# Patient Record
Sex: FEMALE | Race: BLACK OR AFRICAN AMERICAN | NOT HISPANIC OR LATINO | Employment: FULL TIME | URBAN - NONMETROPOLITAN AREA
[De-identification: names, ages, dates, MRNs, and addresses within clinical notes are randomized per-mention and may not be internally consistent; named-entity substitution may affect disease eponyms.]

---

## 2023-01-20 ENCOUNTER — OFFICE VISIT (OUTPATIENT)
Dept: FAMILY MEDICINE | Facility: CLINIC | Age: 48
End: 2023-01-20
Payer: COMMERCIAL

## 2023-01-20 VITALS
BODY MASS INDEX: 48.82 KG/M2 | HEIGHT: 65 IN | WEIGHT: 293 LBS | TEMPERATURE: 98 F | OXYGEN SATURATION: 99 % | SYSTOLIC BLOOD PRESSURE: 127 MMHG | DIASTOLIC BLOOD PRESSURE: 78 MMHG | RESPIRATION RATE: 18 BRPM | HEART RATE: 90 BPM

## 2023-01-20 DIAGNOSIS — Z11.3 SCREEN FOR STD (SEXUALLY TRANSMITTED DISEASE): Primary | ICD-10-CM

## 2023-01-20 LAB
HIV 1+O+2 AB SERPL QL: NORMAL
SYPHILIS AB INTERPRETATION: NORMAL

## 2023-01-20 PROCEDURE — 1159F PR MEDICATION LIST DOCUMENTED IN MEDICAL RECORD: ICD-10-PCS | Mod: ,,, | Performed by: NURSE PRACTITIONER

## 2023-01-20 PROCEDURE — 3074F SYST BP LT 130 MM HG: CPT | Mod: ,,, | Performed by: NURSE PRACTITIONER

## 2023-01-20 PROCEDURE — 87491 CHLAMYDIA/GONORRHOEAE(GC), PCR: ICD-10-PCS | Mod: ,,, | Performed by: CLINICAL MEDICAL LABORATORY

## 2023-01-20 PROCEDURE — 87661 TRICHOMONAS VAGINALIS AMPLIF: CPT | Mod: ,,, | Performed by: CLINICAL MEDICAL LABORATORY

## 2023-01-20 PROCEDURE — 87389 HIV 1 / 2 ANTIBODY: ICD-10-PCS | Mod: ,,, | Performed by: CLINICAL MEDICAL LABORATORY

## 2023-01-20 PROCEDURE — 3008F PR BODY MASS INDEX (BMI) DOCUMENTED: ICD-10-PCS | Mod: ,,, | Performed by: NURSE PRACTITIONER

## 2023-01-20 PROCEDURE — 99203 PR OFFICE/OUTPT VISIT, NEW, LEVL III, 30-44 MIN: ICD-10-PCS | Mod: ,,, | Performed by: NURSE PRACTITIONER

## 2023-01-20 PROCEDURE — 87389 HIV-1 AG W/HIV-1&-2 AB AG IA: CPT | Mod: ,,, | Performed by: CLINICAL MEDICAL LABORATORY

## 2023-01-20 PROCEDURE — 87491 CHLMYD TRACH DNA AMP PROBE: CPT | Mod: ,,, | Performed by: CLINICAL MEDICAL LABORATORY

## 2023-01-20 PROCEDURE — 3078F PR MOST RECENT DIASTOLIC BLOOD PRESSURE < 80 MM HG: ICD-10-PCS | Mod: ,,, | Performed by: NURSE PRACTITIONER

## 2023-01-20 PROCEDURE — 1159F MED LIST DOCD IN RCRD: CPT | Mod: ,,, | Performed by: NURSE PRACTITIONER

## 2023-01-20 PROCEDURE — 87591 CHLAMYDIA/GONORRHOEAE(GC), PCR: ICD-10-PCS | Mod: ,,, | Performed by: CLINICAL MEDICAL LABORATORY

## 2023-01-20 PROCEDURE — 3074F PR MOST RECENT SYSTOLIC BLOOD PRESSURE < 130 MM HG: ICD-10-PCS | Mod: ,,, | Performed by: NURSE PRACTITIONER

## 2023-01-20 PROCEDURE — 86780 TREPONEMA PALLIDUM: CPT | Mod: ,,, | Performed by: CLINICAL MEDICAL LABORATORY

## 2023-01-20 PROCEDURE — 87661 TRICHOMONAS VAGINALIS BY PCR: ICD-10-PCS | Mod: ,,, | Performed by: CLINICAL MEDICAL LABORATORY

## 2023-01-20 PROCEDURE — 99203 OFFICE O/P NEW LOW 30 MIN: CPT | Mod: ,,, | Performed by: NURSE PRACTITIONER

## 2023-01-20 PROCEDURE — 87591 N.GONORRHOEAE DNA AMP PROB: CPT | Mod: ,,, | Performed by: CLINICAL MEDICAL LABORATORY

## 2023-01-20 PROCEDURE — 3078F DIAST BP <80 MM HG: CPT | Mod: ,,, | Performed by: NURSE PRACTITIONER

## 2023-01-20 PROCEDURE — 86780 TREPONEMA PALLIDUM (SYPHILIS) ANTIBODY: ICD-10-PCS | Mod: ,,, | Performed by: CLINICAL MEDICAL LABORATORY

## 2023-01-20 PROCEDURE — 3008F BODY MASS INDEX DOCD: CPT | Mod: ,,, | Performed by: NURSE PRACTITIONER

## 2023-01-20 RX ORDER — HYDROCHLOROTHIAZIDE 12.5 MG/1
CAPSULE ORAL
COMMUNITY

## 2023-01-20 RX ORDER — AMLODIPINE BESYLATE 10 MG/1
TABLET ORAL
COMMUNITY

## 2023-01-20 RX ORDER — LOSARTAN POTASSIUM 100 MG/1
TABLET ORAL
COMMUNITY

## 2023-01-20 NOTE — PROGRESS NOTES
Holly Smith NP   [facility]  [id number] [place] [address] [place] [address]  [phone]      PATIENT NAME: Kristan Mei  : 1975  DATE: 23  MRN: [id number]      Billing Provider: Holly Smith NP  Level of Service: [facility] OFFICE/OUTPT VISIT, NEW, LEVL III, 30-44 MIN  Patient PCP Information       Provider PCP Type    Holly Smith NP General            Reason for Visit / Chief Complaint: Exposure to STD (States her sex partner has been having some urinary issues./States he is gone from home a lot for his work./She has concerns and would like to be checked for std's./Denies any personal symptoms.)       Update PCP  Update Chief Complaint         History of Present Illness / Problem Focused Workflow     Kristan Mei presents to the clinic with Exposure to STD (States her sex partner has been having some urinary issues./States he is gone from home a lot for his work./She has concerns and would like to be checked for std's./Denies any personal symptoms.)     47 year old female presents to clinic with complaints of possible exposure to STDs. She states her partner has been having some burning with urination, that he is out of town a lot for work. She does not know if he has an STD but she just wants to be checked. She denies any discharge, odor, pain, or lesions.        Review of Systems     Review of Systems   Constitutional:  Negative for appetite change, fatigue and fever.   HENT:  Negative for congestion, postnasal drip and sinus pressure.    Eyes:  Negative for visual disturbance.   Respiratory:  Negative for cough and shortness of breath.    Cardiovascular:  Negative for palpitations and leg swelling.   Gastrointestinal:  Negative for abdominal distention and abdominal pain.   Endocrine: Negative for cold intolerance and heat intolerance.   Genitourinary:  Negative for difficulty urinating, dysuria and flank pain.   Musculoskeletal:   Negative for arthralgias and myalgias.   Skin:  Negative for rash and wound.   Neurological:  Negative for dizziness, light-headedness and headaches.   Psychiatric/Behavioral:  Negative for sleep disturbance.      Medical / Social / Family History     Past Medical History:   Diagnosis Date    Hypertension        History reviewed. No pertinent surgical history.    Social History  Ms. Mei  reports that she has never smoked. She has never used smokeless tobacco. She reports that she does not drink alcohol and does not use drugs.    Family History  Ms.'s Mei family history includes Heart disease in her mother.    Medications and Allergies     Medications  Outpatient Medications Marked as Taking for the 1/20/23 encounter (Office Visit) with Holly Smith NP   Medication Sig Dispense Refill    amLODIPine (NORVASC) 10 MG tablet 1 tablet Orally Once a day for 30 day(s)      hydroCHLOROthiazide (MICROZIDE) 12.5 mg capsule 1 capsule in the morning Orally Once a day for 30 day(s)      losartan (COZAAR) 100 MG tablet 1 tablet Orally Once a day for 90 days         Allergies  Review of patient's allergies indicates:  No Known Allergies    Physical Examination     Vitals:    01/20/23 1328   BP: 127/78   Pulse: 90   Resp: 18   Temp: 98.4 °F (36.9 °C)     Physical Exam  Vitals and nursing note reviewed.   Constitutional:       Appearance: Normal appearance.   HENT:      Head: Normocephalic.      Right Ear: Tympanic membrane normal.      Left Ear: Tympanic membrane normal.      Nose: Nose normal.      Mouth/Throat:      Mouth: Mucous membranes are moist.   Eyes:      Conjunctiva/sclera: Conjunctivae normal.   Cardiovascular:      Rate and Rhythm: Normal rate and regular rhythm.      Pulses: Normal pulses.      Heart sounds: Normal heart sounds.   Pulmonary:      Effort: Pulmonary effort is normal.      Breath sounds: Normal breath sounds.   Abdominal:      General: Bowel sounds are normal.      Palpations: Abdomen is  soft.   Genitourinary:     Comments: She declines  exam but denies any discharge, odor, pain, or lesions.    Musculoskeletal:         General: Normal range of motion.      Cervical back: Normal range of motion.   Skin:     General: Skin is warm and dry.      Capillary Refill: Capillary refill takes less than 2 seconds.   Neurological:      Mental Status: She is alert and oriented to person, place, and time.   Psychiatric:         Mood and Affect: Mood normal.       Assessment and Plan (including Health Maintenance)      Problem List  Smart Sets  Document Outside HM   :    Plan:         Health Maintenance Due   Topic Date Due    Hepatitis C Screening  Never done    Cervical Cancer Screening  Never done    Lipid Panel  Never done    COVID-19 Vaccine (1) Never done    HIV Screening  Never done    TETANUS VACCINE  Never done    Mammogram  Never done    Hemoglobin A1c (Diabetic Prevention Screening)  Never done    Influenza Vaccine (1) Never done    Colorectal Cancer Screening  Never done       Problem List Items Addressed This Visit          ID    Screen for STD (sexually transmitted disease) - Primary    Current Assessment & Plan     Testing for HIV, Syphillis, GC, Chlamydia, Trichomonas performed. Will follow up with results.   Safer sex practices encouraged using a barrier method. Avoid sexual activities until symptoms subside and test results are received. Instructed to follow-up if symptoms persist past treatment plan or worsen to return to clinic for further evaluation and treatment.'           Relevant Orders    Chlamydia/GC, PCR    HIV 1/2 Ag/Ab (4th Gen)    Trichomonas vaginalis by PCR    Syphilis Antibody with reflex to RPR       The patient has no Health Maintenance topics of status Not Due    No future appointments.         Signature:  Holly Smith NP  [facility] [address] [place] [address]  [phone]    Date of encounter: 1/20/23

## 2023-01-20 NOTE — ASSESSMENT & PLAN NOTE
Testing for HIV, Syphillis, GC, Chlamydia, Trichomonas performed. Will follow up with results.   Safer sex practices encouraged using a barrier method. Avoid sexual activities until symptoms subside and test results are received. Instructed to follow-up if symptoms persist past treatment plan or worsen to return to clinic for further evaluation and treatment.'

## 2023-01-21 LAB
CHLAMYDIA BY PCR: NEGATIVE
N. GONORRHOEAE (GC) BY PCR: NEGATIVE
TRICHOMONAS NAT: NEGATIVE

## 2023-01-22 NOTE — PROGRESS NOTES
Labs reviewed: Please contact patient and notify that her Chlamydia, Gonorrhea, Trich, HIV, and Syphillis were all negative. Encourage safe sex practices.

## 2023-07-21 ENCOUNTER — PATIENT MESSAGE (OUTPATIENT)
Dept: ADMINISTRATIVE | Facility: HOSPITAL | Age: 48
End: 2023-07-21

## 2024-07-02 LAB
HUMAN PAPILLOMAVIRUS (HPV): NORMAL
PAP RECOMMENDATION EXT: ABNORMAL

## 2024-11-13 ENCOUNTER — PATIENT OUTREACH (OUTPATIENT)
Facility: HOSPITAL | Age: 49
End: 2024-11-13
Payer: COMMERCIAL

## 2024-11-13 ENCOUNTER — OFFICE VISIT (OUTPATIENT)
Dept: FAMILY MEDICINE | Facility: CLINIC | Age: 49
End: 2024-11-13
Payer: COMMERCIAL

## 2024-11-13 VITALS
OXYGEN SATURATION: 100 % | TEMPERATURE: 99 F | SYSTOLIC BLOOD PRESSURE: 126 MMHG | HEIGHT: 65 IN | BODY MASS INDEX: 48.82 KG/M2 | RESPIRATION RATE: 18 BRPM | WEIGHT: 293 LBS | DIASTOLIC BLOOD PRESSURE: 86 MMHG | HEART RATE: 83 BPM

## 2024-11-13 DIAGNOSIS — Z72.51 HIGH RISK SEXUAL BEHAVIOR, UNSPECIFIED TYPE: ICD-10-CM

## 2024-11-13 DIAGNOSIS — Z11.3 SCREENING FOR STDS (SEXUALLY TRANSMITTED DISEASES): ICD-10-CM

## 2024-11-13 DIAGNOSIS — R53.83 FATIGUE, UNSPECIFIED TYPE: Primary | ICD-10-CM

## 2024-11-13 DIAGNOSIS — Z13.1 SCREENING FOR DIABETES MELLITUS: ICD-10-CM

## 2024-11-13 DIAGNOSIS — Z13.220 SCREENING FOR LIPOID DISORDERS: ICD-10-CM

## 2024-11-13 LAB
25(OH)D3 SERPL-MCNC: 17.3 NG/ML (ref 30–80)
ALBUMIN SERPL BCP-MCNC: 3.8 G/DL (ref 3.5–5)
ALBUMIN/GLOB SERPL: 0.9 {RATIO}
ALP SERPL-CCNC: 61 U/L (ref 40–150)
ALT SERPL W P-5'-P-CCNC: 8 U/L (ref 0–55)
ANION GAP SERPL CALCULATED.3IONS-SCNC: 14 MMOL/L (ref 7–16)
AST SERPL W P-5'-P-CCNC: 23 U/L (ref 5–34)
BASOPHILS # BLD AUTO: 0.04 K/UL (ref 0–0.2)
BASOPHILS NFR BLD AUTO: 0.6 % (ref 0–1)
BILIRUB SERPL-MCNC: 0.4 MG/DL
BUN SERPL-MCNC: 10 MG/DL (ref 7–19)
BUN/CREAT SERPL: 12 (ref 6–20)
CALCIUM SERPL-MCNC: 9.6 MG/DL (ref 8.4–10.2)
CHLORIDE SERPL-SCNC: 101 MMOL/L (ref 98–107)
CHOLEST SERPL-MCNC: 214 MG/DL
CHOLEST/HDLC SERPL: 4 {RATIO}
CO2 SERPL-SCNC: 25 MMOL/L (ref 22–29)
CREAT SERPL-MCNC: 0.85 MG/DL (ref 0.55–1.02)
DIFFERENTIAL METHOD BLD: ABNORMAL
EGFR (NO RACE VARIABLE) (RUSH/TITUS): 85 ML/MIN/1.73M2
EOSINOPHIL # BLD AUTO: 0.1 K/UL (ref 0–0.5)
EOSINOPHIL NFR BLD AUTO: 1.4 % (ref 1–4)
ERYTHROCYTE [DISTWIDTH] IN BLOOD BY AUTOMATED COUNT: 16.3 % (ref 11.5–14.5)
EST. AVERAGE GLUCOSE BLD GHB EST-MCNC: 91 MG/DL
FOLATE SERPL-MCNC: 14.3 NG/ML (ref 7–31.4)
GLOBULIN SER-MCNC: 4.2 G/DL (ref 2–4)
GLUCOSE SERPL-MCNC: 71 MG/DL (ref 74–100)
HBA1C MFR BLD HPLC: 4.8 %
HCT VFR BLD AUTO: 40 % (ref 38–47)
HDLC SERPL-MCNC: 53 MG/DL (ref 35–60)
HGB BLD-MCNC: 11.8 G/DL (ref 12–16)
IMM GRANULOCYTES # BLD AUTO: 0.02 K/UL (ref 0–0.04)
IMM GRANULOCYTES NFR BLD: 0.3 % (ref 0–0.4)
LDLC SERPL CALC-MCNC: 131 MG/DL
LDLC/HDLC SERPL: 2.5 {RATIO}
LYMPHOCYTES # BLD AUTO: 1.23 K/UL (ref 1–4.8)
LYMPHOCYTES NFR BLD AUTO: 17.2 % (ref 27–41)
MCH RBC QN AUTO: 25.1 PG (ref 27–31)
MCHC RBC AUTO-ENTMCNC: 29.5 G/DL (ref 32–36)
MCV RBC AUTO: 85.1 FL (ref 80–96)
MONOCYTES # BLD AUTO: 0.48 K/UL (ref 0–0.8)
MONOCYTES NFR BLD AUTO: 6.7 % (ref 2–6)
MPC BLD CALC-MCNC: 12 FL (ref 9.4–12.4)
NEUTROPHILS # BLD AUTO: 5.27 K/UL (ref 1.8–7.7)
NEUTROPHILS NFR BLD AUTO: 73.8 % (ref 53–65)
NONHDLC SERPL-MCNC: 161 MG/DL
NRBC # BLD AUTO: 0 X10E3/UL
NRBC, AUTO (.00): 0 %
PLATELET # BLD AUTO: 320 K/UL (ref 150–400)
POTASSIUM SERPL-SCNC: 3.9 MMOL/L (ref 3.5–5.1)
PROT SERPL-MCNC: 8 G/DL (ref 6.4–8.3)
RBC # BLD AUTO: 4.7 M/UL (ref 4.2–5.4)
SODIUM SERPL-SCNC: 136 MMOL/L (ref 136–145)
SYPHILIS AB INTERPRETATION: NORMAL
TRIGL SERPL-MCNC: 150 MG/DL (ref 37–140)
TSH SERPL DL<=0.005 MIU/L-ACNC: 1.29 UIU/ML (ref 0.35–4.94)
VIT B12 SERPL-MCNC: 623 PG/ML (ref 213–816)
VLDLC SERPL-MCNC: 30 MG/DL
WBC # BLD AUTO: 7.14 K/UL (ref 4.5–11)

## 2024-11-13 PROCEDURE — 87340 HEPATITIS B SURFACE AG IA: CPT | Mod: ,,, | Performed by: CLINICAL MEDICAL LABORATORY

## 2024-11-13 PROCEDURE — 86780 TREPONEMA PALLIDUM: CPT | Mod: ,,, | Performed by: CLINICAL MEDICAL LABORATORY

## 2024-11-13 PROCEDURE — 3008F BODY MASS INDEX DOCD: CPT | Mod: CPTII,,, | Performed by: NURSE PRACTITIONER

## 2024-11-13 PROCEDURE — 1160F RVW MEDS BY RX/DR IN RCRD: CPT | Mod: CPTII,,, | Performed by: NURSE PRACTITIONER

## 2024-11-13 PROCEDURE — 82607 VITAMIN B-12: CPT | Mod: ,,, | Performed by: CLINICAL MEDICAL LABORATORY

## 2024-11-13 PROCEDURE — 87491 CHLMYD TRACH DNA AMP PROBE: CPT | Mod: ,,, | Performed by: CLINICAL MEDICAL LABORATORY

## 2024-11-13 PROCEDURE — 3044F HG A1C LEVEL LT 7.0%: CPT | Mod: CPTII,,, | Performed by: NURSE PRACTITIONER

## 2024-11-13 PROCEDURE — 83036 HEMOGLOBIN GLYCOSYLATED A1C: CPT | Mod: ,,, | Performed by: CLINICAL MEDICAL LABORATORY

## 2024-11-13 PROCEDURE — 3079F DIAST BP 80-89 MM HG: CPT | Mod: CPTII,,, | Performed by: NURSE PRACTITIONER

## 2024-11-13 PROCEDURE — 87661 TRICHOMONAS VAGINALIS AMPLIF: CPT | Mod: ,,, | Performed by: CLINICAL MEDICAL LABORATORY

## 2024-11-13 PROCEDURE — 87591 N.GONORRHOEAE DNA AMP PROB: CPT | Mod: ,,, | Performed by: CLINICAL MEDICAL LABORATORY

## 2024-11-13 PROCEDURE — 86803 HEPATITIS C AB TEST: CPT | Mod: ,,, | Performed by: CLINICAL MEDICAL LABORATORY

## 2024-11-13 PROCEDURE — 86705 HEP B CORE ANTIBODY IGM: CPT | Mod: ,,, | Performed by: CLINICAL MEDICAL LABORATORY

## 2024-11-13 PROCEDURE — 80050 GENERAL HEALTH PANEL: CPT | Mod: ,,, | Performed by: CLINICAL MEDICAL LABORATORY

## 2024-11-13 PROCEDURE — 83550 IRON BINDING TEST: CPT | Mod: ,,, | Performed by: CLINICAL MEDICAL LABORATORY

## 2024-11-13 PROCEDURE — 82746 ASSAY OF FOLIC ACID SERUM: CPT | Mod: ,,, | Performed by: CLINICAL MEDICAL LABORATORY

## 2024-11-13 PROCEDURE — 1159F MED LIST DOCD IN RCRD: CPT | Mod: CPTII,,, | Performed by: NURSE PRACTITIONER

## 2024-11-13 PROCEDURE — 87389 HIV-1 AG W/HIV-1&-2 AB AG IA: CPT | Mod: ,,, | Performed by: CLINICAL MEDICAL LABORATORY

## 2024-11-13 PROCEDURE — 3074F SYST BP LT 130 MM HG: CPT | Mod: CPTII,,, | Performed by: NURSE PRACTITIONER

## 2024-11-13 PROCEDURE — 4010F ACE/ARB THERAPY RXD/TAKEN: CPT | Mod: CPTII,,, | Performed by: NURSE PRACTITIONER

## 2024-11-13 PROCEDURE — 83540 ASSAY OF IRON: CPT | Mod: ,,, | Performed by: CLINICAL MEDICAL LABORATORY

## 2024-11-13 PROCEDURE — 80061 LIPID PANEL: CPT | Mod: ,,, | Performed by: CLINICAL MEDICAL LABORATORY

## 2024-11-13 PROCEDURE — 99213 OFFICE O/P EST LOW 20 MIN: CPT | Mod: ,,, | Performed by: NURSE PRACTITIONER

## 2024-11-13 PROCEDURE — 82306 VITAMIN D 25 HYDROXY: CPT | Mod: ,,, | Performed by: CLINICAL MEDICAL LABORATORY

## 2024-11-13 RX ORDER — FLUTICASONE PROPIONATE 50 MCG
1 SPRAY, SUSPENSION (ML) NASAL
COMMUNITY
Start: 2024-01-08

## 2024-11-13 RX ORDER — AZELASTINE 1 MG/ML
2 SPRAY, METERED NASAL 2 TIMES DAILY
COMMUNITY
Start: 2024-10-16

## 2024-11-13 RX ORDER — TRIAMTERENE/HYDROCHLOROTHIAZID 37.5-25 MG
1 TABLET ORAL DAILY
COMMUNITY
Start: 2024-08-06

## 2024-11-13 NOTE — LETTER
AUTHORIZATION FOR RELEASE OF   CONFIDENTIAL INFORMATION    Dear [facility],    We are seeing Kristan Mei, date of birth 1975, in the clinic at [facility]. Holly Smith NP is the patient's PCP. Kristan Mei has an outstanding lab/procedure at the time we reviewed her chart. In order to help keep her health information updated, she has authorized us to request the following medical record(s):        (  )  MAMMOGRAM                                      (  )  COLONOSCOPY      (X)  PAP SMEAR                                          (X)  OUTSIDE LAB RESULTS     (  )  DEXA SCAN                                          (  )  EYE EXAM            (  )  FOOT EXAM                                          (  )  ENTIRE RECORD     (  )  OUTSIDE IMMUNIZATIONS                 (  )  _______________         Please fax records to [facility], Ngozi Perez, [phone].     If you have any questions, please contact [phone].          Patient Name: Kristan Mei  : 1975  Patient Phone #: [phone]

## 2024-11-13 NOTE — PROGRESS NOTES
11/13/2024   --Chart accessed for: Care Gaps  Requested mammogram (Marcella) and pap smear (Jovana) records   Health Maintenance Due   Topic Date Due    Hepatitis C Screening  Never done    Cervical Cancer Screening  Never done    Lipid Panel  Never done    TETANUS VACCINE  Never done    Mammogram  Never done    Hemoglobin A1c (Diabetic Prevention Screening)  Never done    Colorectal Cancer Screening  Never done    Influenza Vaccine (1) Never done    COVID-19 Vaccine (1 - 2024-25 season) Never done

## 2024-11-13 NOTE — LETTER
AUTHORIZATION FOR RELEASE OF   CONFIDENTIAL INFORMATION    Dear [facility],    We are seeing Kristan Mei, date of birth 1975, in the clinic at [facility]. Holly Smith NP is the patient's PCP. Kristan Mei has an outstanding lab/procedure at the time we reviewed her chart. In order to help keep her health information updated, she has authorized us to request the following medical record(s):        (X)  MAMMOGRAM                                      (  )  COLONOSCOPY      (  )  PAP SMEAR                                          (  )  OUTSIDE LAB RESULTS     (  )  DEXA SCAN                                          (  )  EYE EXAM            (  )  FOOT EXAM                                          (  )  ENTIRE RECORD     (  )  OUTSIDE IMMUNIZATIONS                 (  )  _______________         Please fax records to [facility], Ngozi Perez, [phone].     If you have any questions, please contact [phone].          Patient Name: Kristan Mei  : 1975  Patient Phone #: [phone]

## 2024-11-13 NOTE — PROGRESS NOTES
Holly Smith NP   [facility]  [address]  [place]  [address]      PATIENT NAME: Kristan Mei  : 1975  DATE: 24  MRN: [id number]      Billing Provider: Holly Smith NP  Level of Service: CT OFFICE/OUTPT VISIT, EST, LEVL III, 20-29 MIN  Patient PCP Information       Provider PCP Type    Holly Smith NP General            Reason for Visit / Chief Complaint: Fatigue (Patient is 49 year old female presents to the clinic with fatigue. Patient states sleeps with c-pap at night. Patient states when waking in the morning feels she has no energy and no sleep. Patient states this has been going on x 3 weeks.  Patient requesting Ten panel STD. )         History of Present Illness / Problem Focused Workflow     49 year old female presents to the clinic with fatigue. Patient states sleeps with c-pap at night. Patient states when waking in the morning feels she has no energy and  has had no sleep. She does report she has been told in the past her iron was low and she takes OTC iron supplements daily. Patient states this has been going on x 3 weeks.  Patient requesting comprehensive STD testing. Denies symptoms. States she just wants to be tested.             Review of Systems     @Review of Systems   Constitutional:  Positive for fatigue. Negative for activity change, appetite change and fever.   HENT:  Negative for nasal congestion, ear pain, rhinorrhea, sinus pressure/congestion and sore throat.    Eyes:  Negative for pain, redness, visual disturbance and eye dryness.   Respiratory:  Negative for cough and shortness of breath.    Cardiovascular:  Negative for chest pain and leg swelling.   Gastrointestinal:  Negative for abdominal distention, abdominal pain, constipation and diarrhea.   Endocrine: Negative for cold intolerance, heat intolerance and polyuria.   Genitourinary:  Negative for bladder incontinence, dysuria, frequency and urgency.   Musculoskeletal:  Negative for  arthralgias, gait problem and myalgias.   Integumentary:  Negative for color change, rash and wound.   Allergic/Immunologic: Negative for environmental allergies and food allergies.   Neurological:  Negative for dizziness, weakness, light-headedness and headaches.   Psychiatric/Behavioral:  Negative for behavioral problems and sleep disturbance.        Medical / Social / Family History     Past Medical History:   Diagnosis Date    Hypertension        History reviewed. No pertinent surgical history.    Medications and Allergies     Medications  Outpatient Medications Marked as Taking for the 11/13/24 encounter (Office Visit) with Holly Smith NP   Medication Sig Dispense Refill    amLODIPine (NORVASC) 10 MG tablet 1 tablet Orally Once a day for 30 day(s)      azelastine (ASTELIN) 137 mcg (0.1 %) nasal spray 2 sprays by Nasal route 2 (two) times daily.      fluticasone propionate (FLONASE ALLERGY RELIEF) 50 mcg/actuation nasal spray 1 spray by Each Nostril route as needed.      losartan (COZAAR) 100 MG tablet 1 tablet Orally Once a day for 90 days      triamterene-hydrochlorothiazide 37.5-25 mg (MAXZIDE-25) 37.5-25 mg per tablet Take 1 tablet by mouth once daily.         Allergies  Review of patient's allergies indicates:  No Known Allergies    Physical Examination     Vitals:    11/13/24 0926   BP: 126/86   Pulse: 83   Resp: 18   Temp: 98.6 °F (37 °C)     Physical Exam  Vitals and nursing note reviewed.   Constitutional:       Appearance: She is obese.   HENT:      Head: Normocephalic.      Nose: Nose normal.      Mouth/Throat:      Mouth: Mucous membranes are moist.      Pharynx: Oropharynx is clear. No posterior oropharyngeal erythema.   Eyes:      Conjunctiva/sclera: Conjunctivae normal.   Cardiovascular:      Rate and Rhythm: Normal rate and regular rhythm.      Pulses: Normal pulses.      Heart sounds: Normal heart sounds.   Pulmonary:      Effort: Pulmonary effort is normal.      Breath sounds: Normal  breath sounds.   Abdominal:      General: Abdomen is flat. Bowel sounds are normal. There is no distension.      Palpations: Abdomen is soft.   Musculoskeletal:         General: No swelling or tenderness. Normal range of motion.      Cervical back: Normal range of motion.      Right lower leg: No edema.      Left lower leg: No edema.   Skin:     General: Skin is warm and dry.      Capillary Refill: Capillary refill takes less than 2 seconds.   Neurological:      Mental Status: She is alert. Mental status is at baseline.   Psychiatric:         Mood and Affect: Mood normal.         Behavior: Behavior normal.               Lab Results   Component Value Date    WBC 7.14 11/13/2024    HGB 11.8 (L) 11/13/2024    HCT 40.0 11/13/2024    MCV 85.1 11/13/2024     11/13/2024        CMP  Sodium   Date Value Ref Range Status   11/13/2024 136 136 - 145 mmol/L Final     Potassium   Date Value Ref Range Status   11/13/2024 3.9 3.5 - 5.1 mmol/L Final     Chloride   Date Value Ref Range Status   11/13/2024 101 98 - 107 mmol/L Final     CO2   Date Value Ref Range Status   11/13/2024 25 22 - 29 mmol/L Final     Glucose   Date Value Ref Range Status   11/13/2024 71 (L) 74 - 100 mg/dL Final     BUN   Date Value Ref Range Status   11/13/2024 10 7 - 19 mg/dL Final     Creatinine   Date Value Ref Range Status   11/13/2024 0.85 0.55 - 1.02 mg/dL Final     Calcium   Date Value Ref Range Status   11/13/2024 9.6 8.4 - 10.2 mg/dL Final     Total Protein   Date Value Ref Range Status   11/13/2024 8.0 6.4 - 8.3 g/dL Final     Albumin   Date Value Ref Range Status   11/13/2024 3.8 3.5 - 5.0 g/dL Final     Bilirubin, Total   Date Value Ref Range Status   11/13/2024 0.4 <=1.5 mg/dL Final     Alk Phos   Date Value Ref Range Status   11/13/2024 61 40 - 150 U/L Final     AST   Date Value Ref Range Status   11/13/2024 23 5 - 34 U/L Final     ALT   Date Value Ref Range Status   11/13/2024 8 0 - 55 U/L Final     Anion Gap   Date Value Ref Range  Status   11/13/2024 14 7 - 16 mmol/L Final     eGFR   Date Value Ref Range Status   11/13/2024 85 >=60 mL/min/1.73m2 Final     Procedures   Assessment and Plan (including Health Maintenance)   :    Plan:     Problem List Items Addressed This Visit          ID    Screening for STDs (sexually transmitted diseases)    Relevant Orders    Syphilis Antibody with reflex to RPR (Completed)    Trichomonas vaginalis by PCR (Completed)    HIV 1/2 Ag/Ab (4th Gen) (Completed)    Hepatitis B Surface Antigen (Completed)    Chlamydia/GC, PCR (Completed)    Hepatitis B Core Antibody, IgM (Completed)    Hepatitis C Antibody (Completed)    Bacterial Vaginosis (Completed)       Other    Fatigue - Primary    Relevant Orders    CBC Auto Differential (Completed)    Iron and TIBC (Completed)    TSH (Completed)    Comprehensive Metabolic Panel (Completed)    Vitamin D (Completed)    Vitamin B12 & Folate (Completed)     Other Visit Diagnoses       Screening for diabetes mellitus        Relevant Orders    Hemoglobin A1C (Completed)    Screening for lipoid disorders        Relevant Orders    Lipid Panel (Completed)    High risk sexual behavior, unspecified type        Relevant Orders    Bacterial Vaginosis (Completed)            Health Maintenance Topics with due status: Not Due       Topic Last Completion Date    Hemoglobin A1c (Diabetic Prevention Screening) 11/13/2024    Lipid Panel 11/13/2024    RSV Vaccine (Age 60+ and Pregnant patients) Not Due       Future Appointments   Date Time Provider Department Center   5/13/2025  9:00 AM Holly Smith NP [facility]        Health Maintenance Due   Topic Date Due    Cervical Cancer Screening  Never done    TETANUS VACCINE  Never done    Mammogram  Never done    Colorectal Cancer Screening  Never done    Influenza Vaccine (1) Never done    COVID-19 Vaccine (1 - 2024-25 season) Never done          Signature:  Holly Smith NP  [place] [id number] [facility]  [address]  [place]  [address]    Date of encounter: 11/13/24

## 2024-11-13 NOTE — Clinical Note
Had Mammo at [facility] in September/Oct Reports had PAP smear done at [facility] in Dec of 2023.

## 2024-11-14 ENCOUNTER — PATIENT MESSAGE (OUTPATIENT)
Dept: FAMILY MEDICINE | Facility: CLINIC | Age: 49
End: 2024-11-14
Payer: COMMERCIAL

## 2024-11-14 DIAGNOSIS — N76.0 BACTERIAL VAGINOSIS: Primary | ICD-10-CM

## 2024-11-14 DIAGNOSIS — B96.89 BACTERIAL VAGINOSIS: Primary | ICD-10-CM

## 2024-11-14 DIAGNOSIS — E55.9 VITAMIN D DEFICIENCY: ICD-10-CM

## 2024-11-14 LAB
CANDIDA SPECIES: NEGATIVE
CHLAMYDIA BY PCR: NEGATIVE
GARDNERELLA: POSITIVE
HBV CORE IGM SER QL: NORMAL
HBV SURFACE AG SERPL QL IA: NORMAL
HCV AB SER QL: NORMAL
HIV 1+O+2 AB SERPL QL: NORMAL
IRON SATN MFR SERPL: 26 % (ref 14–50)
IRON SERPL-MCNC: 67 ΜG/DL (ref 50–170)
N. GONORRHOEAE (GC) BY PCR: NEGATIVE
TIBC SERPL-MCNC: 257 ΜG/DL (ref 250–450)
TRICHOMONAS NAT: NEGATIVE
TRICHOMONAS: NEGATIVE

## 2024-11-14 PROCEDURE — 87510 GARDNER VAG DNA DIR PROBE: CPT | Mod: ,,, | Performed by: CLINICAL MEDICAL LABORATORY

## 2024-11-14 PROCEDURE — 87660 TRICHOMONAS VAGIN DIR PROBE: CPT | Mod: ,,, | Performed by: CLINICAL MEDICAL LABORATORY

## 2024-11-14 PROCEDURE — 87480 CANDIDA DNA DIR PROBE: CPT | Mod: ,,, | Performed by: CLINICAL MEDICAL LABORATORY

## 2024-11-14 RX ORDER — METRONIDAZOLE 500 MG/1
500 TABLET ORAL EVERY 12 HOURS
Qty: 14 TABLET | Refills: 0 | Status: SHIPPED | OUTPATIENT
Start: 2024-11-14

## 2024-11-14 RX ORDER — ERGOCALCIFEROL 1.25 MG/1
50000 CAPSULE ORAL
Qty: 12 CAPSULE | Refills: 1 | Status: SHIPPED | OUTPATIENT
Start: 2024-11-14

## 2024-11-14 NOTE — PROGRESS NOTES
Labs reviewed: Positive for bacterial vaginosis. I have sent in Flagyl twice daily x 7 days. Do not drink alcohol while taking this medication. Take all medication until complete. Negative for yeast and trich. HIV negative. Hep B and Hep C negative. Syphilis negative. Chlamydia and Gonorrhea still pending.   Vitamin B12 and Thyroid levels normal. Iron and TIBC normal. Her CBC showed some very slight abnormalities but this may be related to her anemia. Continue daily iron supplementation that she is taking OTC. CMP normal. Cholesterol elevated. She needs to follow low fat/low cholesterol diet with increased exercise. She could also try OTC meds such as Iban Red Krill Oil. If it does not improve we may consider starting her on statin medication.   Hgb A1C 4.8 which is normal.   Vitamin D was low at 17. This could be the cause of her fatigue. I have sent in supplement for this that she will take once weekly. Follow up in 3 months for recheck.

## 2024-11-18 PROBLEM — R53.83 FATIGUE: Status: ACTIVE | Noted: 2024-11-18

## 2024-12-04 ENCOUNTER — PATIENT OUTREACH (OUTPATIENT)
Facility: HOSPITAL | Age: 49
End: 2024-12-04
Payer: COMMERCIAL

## 2024-12-04 NOTE — PROGRESS NOTES
Uploaded pap smear and HPV from [facility], repeat in 1 year. Pt pap smear was abnormal.  Uploaded mammogram from [place], repeat in 1 year

## 2024-12-15 ENCOUNTER — PATIENT MESSAGE (OUTPATIENT)
Dept: ADMINISTRATIVE | Facility: HOSPITAL | Age: 49
End: 2024-12-15

## 2025-05-13 ENCOUNTER — RESULTS FOLLOW-UP (OUTPATIENT)
Dept: FAMILY MEDICINE | Facility: CLINIC | Age: 50
End: 2025-05-13

## 2025-05-13 ENCOUNTER — OFFICE VISIT (OUTPATIENT)
Dept: FAMILY MEDICINE | Facility: CLINIC | Age: 50
End: 2025-05-13
Payer: COMMERCIAL

## 2025-05-13 VITALS
HEIGHT: 65 IN | RESPIRATION RATE: 20 BRPM | HEART RATE: 81 BPM | TEMPERATURE: 98 F | DIASTOLIC BLOOD PRESSURE: 78 MMHG | OXYGEN SATURATION: 98 % | SYSTOLIC BLOOD PRESSURE: 136 MMHG | BODY MASS INDEX: 48.82 KG/M2 | WEIGHT: 293 LBS

## 2025-05-13 DIAGNOSIS — Z13.1 SCREENING FOR DIABETES MELLITUS: ICD-10-CM

## 2025-05-13 DIAGNOSIS — Z12.11 SCREENING FOR MALIGNANT NEOPLASM OF COLON: ICD-10-CM

## 2025-05-13 DIAGNOSIS — E78.5 HYPERLIPIDEMIA, UNSPECIFIED HYPERLIPIDEMIA TYPE: Primary | ICD-10-CM

## 2025-05-13 DIAGNOSIS — I10 HYPERTENSION, UNSPECIFIED TYPE: Primary | ICD-10-CM

## 2025-05-13 DIAGNOSIS — Z13.220 SCREENING FOR LIPID DISORDERS: ICD-10-CM

## 2025-05-13 DIAGNOSIS — Z12.4 SCREENING FOR MALIGNANT NEOPLASM OF CERVIX: ICD-10-CM

## 2025-05-13 DIAGNOSIS — E55.9 VITAMIN D DEFICIENCY: ICD-10-CM

## 2025-05-13 LAB
25(OH)D3 SERPL-MCNC: 46.9 NG/ML (ref 30–80)
ALBUMIN SERPL BCP-MCNC: 3.9 G/DL (ref 3.5–5)
ALBUMIN/GLOB SERPL: 0.8 {RATIO}
ALP SERPL-CCNC: 73 U/L (ref 40–150)
ALT SERPL W P-5'-P-CCNC: 10 U/L
ANION GAP SERPL CALCULATED.3IONS-SCNC: 14 MMOL/L (ref 7–16)
AST SERPL W P-5'-P-CCNC: 61 U/L (ref 11–45)
BASOPHILS # BLD AUTO: 0.04 K/UL (ref 0–0.2)
BASOPHILS NFR BLD AUTO: 1 % (ref 0–1)
BILIRUB SERPL-MCNC: 0.3 MG/DL
BUN SERPL-MCNC: 12 MG/DL (ref 7–19)
BUN/CREAT SERPL: 14 (ref 6–20)
CALCIUM SERPL-MCNC: 9.8 MG/DL (ref 8.4–10.2)
CHLORIDE SERPL-SCNC: 102 MMOL/L (ref 98–107)
CHOLEST SERPL-MCNC: 209 MG/DL
CHOLEST/HDLC SERPL: 4.6 {RATIO}
CO2 SERPL-SCNC: 26 MMOL/L (ref 22–29)
CREAT SERPL-MCNC: 0.84 MG/DL (ref 0.55–1.02)
DIFFERENTIAL METHOD BLD: ABNORMAL
EGFR (NO RACE VARIABLE) (RUSH/TITUS): 86 ML/MIN/1.73M2
EOSINOPHIL # BLD AUTO: 0.13 K/UL (ref 0–0.5)
EOSINOPHIL NFR BLD AUTO: 3.2 % (ref 1–4)
ERYTHROCYTE [DISTWIDTH] IN BLOOD BY AUTOMATED COUNT: 14.5 % (ref 11.5–14.5)
EST. AVERAGE GLUCOSE BLD GHB EST-MCNC: 97 MG/DL
GLOBULIN SER-MCNC: 4.6 G/DL (ref 2–4)
GLUCOSE SERPL-MCNC: 87 MG/DL (ref 74–100)
HBA1C MFR BLD HPLC: 5 %
HCT VFR BLD AUTO: 43.4 % (ref 38–47)
HDLC SERPL-MCNC: 45 MG/DL (ref 35–60)
HGB BLD-MCNC: 12.7 G/DL (ref 12–16)
IMM GRANULOCYTES # BLD AUTO: 0.01 K/UL (ref 0–0.04)
IMM GRANULOCYTES NFR BLD: 0.2 % (ref 0–0.4)
LDLC SERPL CALC-MCNC: 134 MG/DL
LDLC/HDLC SERPL: 3 {RATIO}
LYMPHOCYTES # BLD AUTO: 1.02 K/UL (ref 1–4.8)
LYMPHOCYTES NFR BLD AUTO: 25.1 % (ref 27–41)
MCH RBC QN AUTO: 25 PG (ref 27–31)
MCHC RBC AUTO-ENTMCNC: 29.3 G/DL (ref 32–36)
MCV RBC AUTO: 85.3 FL (ref 80–96)
MONOCYTES # BLD AUTO: 0.28 K/UL (ref 0–0.8)
MONOCYTES NFR BLD AUTO: 6.9 % (ref 2–6)
MPC BLD CALC-MCNC: 12.1 FL (ref 9.4–12.4)
NEUTROPHILS # BLD AUTO: 2.58 K/UL (ref 1.8–7.7)
NEUTROPHILS NFR BLD AUTO: 63.6 % (ref 53–65)
NONHDLC SERPL-MCNC: 164 MG/DL
NRBC # BLD AUTO: 0 X10E3/UL
NRBC, AUTO (.00): 0 %
PLATELET # BLD AUTO: 317 K/UL (ref 150–400)
POTASSIUM SERPL-SCNC: 4.2 MMOL/L (ref 3.5–5.1)
PROT SERPL-MCNC: 8.5 G/DL (ref 6.4–8.3)
RBC # BLD AUTO: 5.09 M/UL (ref 4.2–5.4)
SODIUM SERPL-SCNC: 138 MMOL/L (ref 136–145)
TRIGL SERPL-MCNC: 150 MG/DL (ref 37–140)
VLDLC SERPL-MCNC: 30 MG/DL
WBC # BLD AUTO: 4.06 K/UL (ref 4.5–11)

## 2025-05-13 PROCEDURE — 3075F SYST BP GE 130 - 139MM HG: CPT | Mod: CPTII,,, | Performed by: NURSE PRACTITIONER

## 2025-05-13 PROCEDURE — 1159F MED LIST DOCD IN RCRD: CPT | Mod: CPTII,,, | Performed by: NURSE PRACTITIONER

## 2025-05-13 PROCEDURE — 99214 OFFICE O/P EST MOD 30 MIN: CPT | Mod: ,,, | Performed by: NURSE PRACTITIONER

## 2025-05-13 PROCEDURE — 85025 COMPLETE CBC W/AUTO DIFF WBC: CPT | Mod: ,,, | Performed by: CLINICAL MEDICAL LABORATORY

## 2025-05-13 PROCEDURE — 3044F HG A1C LEVEL LT 7.0%: CPT | Mod: CPTII,,, | Performed by: NURSE PRACTITIONER

## 2025-05-13 PROCEDURE — 80053 COMPREHEN METABOLIC PANEL: CPT | Mod: ,,, | Performed by: CLINICAL MEDICAL LABORATORY

## 2025-05-13 PROCEDURE — 3008F BODY MASS INDEX DOCD: CPT | Mod: CPTII,,, | Performed by: NURSE PRACTITIONER

## 2025-05-13 PROCEDURE — 83036 HEMOGLOBIN GLYCOSYLATED A1C: CPT | Mod: ,,, | Performed by: CLINICAL MEDICAL LABORATORY

## 2025-05-13 PROCEDURE — 3078F DIAST BP <80 MM HG: CPT | Mod: CPTII,,, | Performed by: NURSE PRACTITIONER

## 2025-05-13 PROCEDURE — 82306 VITAMIN D 25 HYDROXY: CPT | Mod: ,,, | Performed by: CLINICAL MEDICAL LABORATORY

## 2025-05-13 PROCEDURE — 80061 LIPID PANEL: CPT | Mod: ,,, | Performed by: CLINICAL MEDICAL LABORATORY

## 2025-05-13 PROCEDURE — 4010F ACE/ARB THERAPY RXD/TAKEN: CPT | Mod: CPTII,,, | Performed by: NURSE PRACTITIONER

## 2025-05-13 PROCEDURE — 1160F RVW MEDS BY RX/DR IN RCRD: CPT | Mod: CPTII,,, | Performed by: NURSE PRACTITIONER

## 2025-05-13 RX ORDER — FERROUS SULFATE 324(65)MG
324 TABLET, DELAYED RELEASE (ENTERIC COATED) ORAL DAILY
COMMUNITY

## 2025-05-13 RX ORDER — TRIAMTERENE AND HYDROCHLOROTHIAZIDE 37.5; 25 MG/1; MG/1
1 TABLET ORAL DAILY
Qty: 90 TABLET | Refills: 1 | Status: SHIPPED | OUTPATIENT
Start: 2025-05-13

## 2025-05-13 RX ORDER — AMLODIPINE BESYLATE 10 MG/1
10 TABLET ORAL DAILY
Qty: 90 TABLET | Refills: 1 | Status: SHIPPED | OUTPATIENT
Start: 2025-05-13

## 2025-05-13 RX ORDER — ERGOCALCIFEROL 1.25 MG/1
50000 CAPSULE ORAL
Qty: 12 CAPSULE | Refills: 1 | Status: SHIPPED | OUTPATIENT
Start: 2025-05-13

## 2025-05-13 RX ORDER — LOSARTAN POTASSIUM 100 MG/1
100 TABLET ORAL DAILY
Qty: 90 TABLET | Refills: 1 | Status: SHIPPED | OUTPATIENT
Start: 2025-05-13

## 2025-05-13 RX ORDER — ROSUVASTATIN CALCIUM 10 MG/1
10 TABLET, COATED ORAL DAILY
Qty: 90 TABLET | Refills: 1 | Status: SHIPPED | OUTPATIENT
Start: 2025-05-13 | End: 2026-05-13

## 2025-05-13 NOTE — PROGRESS NOTES
Holly Smith NP   [facility]  [address]  [place]  [address]      PATIENT NAME: Kristan Mei  : 1975  DATE: 25  MRN: [id number]      Level of Service:   PCP: Holly Smith NP     Reason for Visit / Chief Complaint: Follow-up (6 month. She took her last dose of Vitamin D Friday. Patient has been getting plenty of exercise.  Has not followed low fat/cholesterol diet not started taking tyrese red krill oil. Continues to take Iron supplements. Reports Fatigue is better. She is fasting due for medication refill and lab. ), Hypertension (Patient is followed by Dr Causey at [facility] for cardiology . Recent cardiology appt. She also will be going to a geneticists for Amyloidosis. ), and Health Maintenance (TETANUS VACCINE Never done/Colorectal Cancer Screening Never done--cologuard ordered/COVID-19 Vaccine( season) Never done/Cervical Cancer Screening due on 2025--ordered//Declined all vaccines/)     History of Present Illness / Review of Systems   History of Present Illness    CHIEF COMPLAINT:  Patient presents for a follow-up on chronic conditions of HTN and Vitamin D Deficiency. She reports experiencing stress related to work and family responsibilities.    HPI:  Patient reports stress due to work and family responsibilities. She has been caring for her 89-year-old mother who has been hospitalized with heart problems. Her mother was previously diagnosed with amyloidosis, which has progressed to an advanced stage. Her mother has severe neuropathy in her feet and hands, with significant loss of sensation affecting her ability to perform tasks like gripping a doorknob.    She travels to [place] twice weekly to care for her mother, causing her to miss work and face financial strain. She is also helping her 82-year-old uncle who lives nearby and needs assistance. She feels overwhelmed and fatigued, often needing to nap.    She has been diagnosed with the gene  associated with amyloidosis. She has undergone several tests including a heart echo, wearing a heart monitor, and nerve testing. The nerve testing, performed on Thursday, was uncomfortable and painful. Results from her tests have been mostly good, with her condition assessed as borderline and not requiring medication at this time.     She reports taking iron supplements, which she believes have somewhat improved her fatigue. She also mentions taking vitamin D supplements, with her last dose on Friday. She has been evaluated by a cardiologist, Dr. Causey, at [facility]. She is planning to see a  to review all her test results and coordinate care with her cardiologist.     She denies headaches, blurred vision, and chest pain.      ROS:  General: -fever, -chills, +fatigue, -weight gain, -weight loss  Eyes: -vision changes, -redness, -discharge  ENT: -ear pain, -nasal congestion, -sore throat  Cardiovascular: -chest pain, -palpitations, -lower extremity edema  Respiratory: -cough, -shortness of breath  Gastrointestinal: -abdominal pain, -nausea, -vomiting, -diarrhea, -constipation, -blood in stool  Genitourinary: -dysuria, -hematuria, -frequency  Musculoskeletal: -joint pain, -muscle pain  Skin: -rash, -lesion  Neurological: -headache, -dizziness, +numbness, -tingling  Psychiatric: -anxiety, -depression, -sleep difficulty, +increased stressors          Medical / Social / Family History     Past Medical History:   Diagnosis Date    Amyloidosis     Hypertension        History reviewed. No pertinent surgical history.    Medications and Allergies     Outpatient Medications Marked as Taking for the 5/13/25 encounter (Office Visit) with Holly Smith NP   Medication Sig Dispense Refill    azelastine (ASTELIN) 137 mcg (0.1 %) nasal spray 2 sprays by Nasal route 2 (two) times daily.      ferrous sulfate 324 mg (65 mg iron) TbEC Take 324 mg by mouth once daily.      fluticasone propionate (FLONASE ALLERGY RELIEF) 50  mcg/actuation nasal spray 1 spray by Each Nostril route as needed.      [DISCONTINUED] amLODIPine (NORVASC) 10 MG tablet 1 tablet Orally Once a day for 30 day(s)      [DISCONTINUED] ergocalciferol (ERGOCALCIFEROL) 50,000 unit Cap Take 1 capsule (50,000 Units total) by mouth every 7 days. 12 capsule 1    [DISCONTINUED] losartan (COZAAR) 100 MG tablet 1 tablet Orally Once a day for 90 days      [DISCONTINUED] triamterene-hydrochlorothiazide 37.5-25 mg (MAXZIDE-25) 37.5-25 mg per tablet Take 1 tablet by mouth once daily.         Review of patient's allergies indicates:  No Known Allergies    Physical Examination     Vitals:    05/13/25 0942   BP: 136/78   Pulse:    Resp:    Temp:      Physical Exam  Vitals and nursing note reviewed.   Constitutional:       Appearance: She is obese.   HENT:      Head: Normocephalic.      Right Ear: Tympanic membrane normal.      Left Ear: Tympanic membrane normal.      Nose: Nose normal.      Mouth/Throat:      Mouth: Mucous membranes are moist.      Pharynx: Oropharynx is clear. No posterior oropharyngeal erythema.   Eyes:      Conjunctiva/sclera: Conjunctivae normal.   Cardiovascular:      Rate and Rhythm: Normal rate and regular rhythm.      Pulses: Normal pulses.      Heart sounds: Normal heart sounds.   Pulmonary:      Effort: Pulmonary effort is normal.      Breath sounds: Normal breath sounds.   Abdominal:      General: Abdomen is flat. Bowel sounds are normal. There is no distension.      Palpations: Abdomen is soft.   Musculoskeletal:         General: No swelling or tenderness. Normal range of motion.      Cervical back: Normal range of motion.      Right lower leg: No edema.      Left lower leg: No edema.   Skin:     General: Skin is warm and dry.      Capillary Refill: Capillary refill takes less than 2 seconds.   Neurological:      Mental Status: She is alert. Mental status is at baseline.   Psychiatric:         Mood and Affect: Mood normal.         Behavior: Behavior  normal.                 Assessment and Plan      Problem List Items Addressed This Visit       Vitamin D deficiency    Relevant Medications    ergocalciferol (ERGOCALCIFEROL) 50,000 unit Cap    Other Relevant Orders    Vitamin D (Completed)     Other Visit Diagnoses         Screening for lipid disorders    -  Primary    Relevant Orders    Lipid Panel (Completed)      Screening for malignant neoplasm of colon        Relevant Orders    Cologuard Screening (Multitarget Stool DNA)      Screening for malignant neoplasm of cervix        Relevant Orders    Ambulatory referral/consult to Gynecology      Hypertension, unspecified type        Relevant Medications    triamterene-hydrochlorothiazide 37.5-25 mg (MAXZIDE-25) 37.5-25 mg per tablet    losartan (COZAAR) 100 MG tablet    amLODIPine (NORVASC) 10 MG tablet    Other Relevant Orders    CBC Auto Differential (Completed)    Comprehensive Metabolic Panel (Completed)      Screening for diabetes mellitus        Relevant Orders    Hemoglobin A1C (Completed)            Assessment & Plan    IMPRESSION:  Assessed stress levels related to caring for elderly family members and work-related issues.  Evaluated amyloidosis management, noting recent nerve testing results and borderline status not requiring medication.  Reviewed BP, initially elevated but improved during the visit, likely due to taking medication.  Considered fatigue in relation to iron supplementation.    HYPERTENSION:  - Blood pressure measured at 140/78 and later 136/78 during visit, elevated likely due to missed medication.  - Renewed prescription for blood pressure medication.  - Advised patient on proper positioning and stillness when using wrist BP monitors for accurate readings.  - Recommend checking BP at home or at a pharmacy, and suggested purchasing a home BP monitor.    FATIGUE:  - Patient reports fatigue is somewhat better since starting iron supplements but still feels the need to take naps.  - Will  continue iron supplementation therapy.      FOLLOW-UP AND ADDITIONAL TESTS:  - Ordered CBC, CMP, lipid panel, and vitamin D level.  - Continuing vitamin D supplementation.  - Ordered Cologuard screening kit to be sent to patient's home.          FOLLOW UP  Will call with lab results.   Follow up in 3 months or as needed.      Health Maintenance     Health Maintenance Topics with due status: Not Due       Topic Last Completion Date    Mammogram 09/09/2024    Hemoglobin A1c (Diabetic Prevention Screening) 05/13/2025    Lipid Panel 05/13/2025    Influenza Vaccine Not Due    RSV Vaccine (Age 60+ and Pregnant patients) Not Due       Health Maintenance Due   Topic Date Due    TETANUS VACCINE  Never done    Colorectal Cancer Screening  Never done    COVID-19 Vaccine (1 - 2024-25 season) Never done    Cervical Cancer Screening  06/19/2025          Signature:  Holly Smith NP  [address]  [address]    Date of encounter: 5/13/25    This note was generated with the assistance of ambient listening technology. Verbal consent was obtained by the patient and accompanying visitor(s) for the recording of patient appointment to facilitate this note. I attest to having reviewed and edited the generated note for accuracy, though some syntax or spelling errors may persist. Please contact the author of this note for any clarification.

## 2025-05-14 NOTE — PROGRESS NOTES
Her cholesterol has not improved with lifestyle changes. I have sent in a statin for her to lower her risk for CV event. Please encourage her to follow low fat/low cholesterol diet and increase exercise. CBC and CMP normal or clinically insignificant. Vitamin D level now in sufficient range. Continue supplementation. Hgb A1C normal at 5.0, no concerns for diabetes.

## 2025-05-22 PROBLEM — I10 HYPERTENSION: Status: ACTIVE | Noted: 2025-05-22

## 2025-05-23 NOTE — PROGRESS NOTES
Holly Smith NP   [facility]  [address]  [place]  [address]      PATIENT NAME: Kristan Mei  : 1975  DATE: 25  MRN: [id number]      Level of Service: [place] OFFICE/OUTPT VISIT, EST, LEVL IV, 30-39 MIN  PCP: Holly Smith NP     Reason for Visit / Chief Complaint: Follow-up (6 month. She took her last dose of Vitamin D Friday. Patient has been getting plenty of exercise.  Has not followed low fat/cholesterol diet not started taking tyrese red krill oil. Continues to take Iron supplements. Reports Fatigue is better. She is fasting due for medication refill and lab. ), Hypertension (Patient is followed by Dr Causey at [facility] for cardiology . Recent cardiology appt. She also will be going to a geneticists for Amyloidosis. ), and Health Maintenance (TETANUS VACCINE Never done/Colorectal Cancer Screening Never done--cologuard ordered/COVID-19 Vaccine(1 - 2024-25 season) Never done/Cervical Cancer Screening due on 2025--ordered//Declined all vaccines/)     History of Present Illness / Review of Systems   History of Present Illness    CHIEF COMPLAINT:  Patient presents for a follow-up on chronic conditions of HTN and Vitamin D Deficiency. She reports experiencing stress related to work and family responsibilities.    HPI:  Patient reports stress due to work and family responsibilities. She has been caring for her 89-year-old mother who has been hospitalized with heart problems. Her mother was previously diagnosed with amyloidosis, which has progressed to an advanced stage. Her mother has severe neuropathy in her feet and hands, with significant loss of sensation affecting her ability to perform tasks like gripping a doorknob.    She travels to [place] twice weekly to care for her mother, causing her to miss work and face financial strain. She is also helping her 82-year-old uncle who lives nearby and needs assistance. She feels overwhelmed and fatigued, often needing to  nap.    She has been diagnosed with the gene associated with amyloidosis. She has undergone several tests including a heart echo, wearing a heart monitor, and nerve testing. The nerve testing, performed on Thursday, was uncomfortable and painful. Results from her tests have been mostly good, with her condition assessed as borderline and not requiring medication at this time.     She reports taking iron supplements, which she believes have somewhat improved her fatigue. She also mentions taking vitamin D supplements, with her last dose on Friday. She has been evaluated by a cardiologist, Dr. Causey, at [facility]. She is planning to see a  to review all her test results and coordinate care with her cardiologist.     She denies headaches, blurred vision, and chest pain.      ROS:  General: -fever, -chills, +fatigue, -weight gain, -weight loss  Eyes: -vision changes, -redness, -discharge  ENT: -ear pain, -nasal congestion, -sore throat  Cardiovascular: -chest pain, -palpitations, -lower extremity edema  Respiratory: -cough, -shortness of breath  Gastrointestinal: -abdominal pain, -nausea, -vomiting, -diarrhea, -constipation, -blood in stool  Genitourinary: -dysuria, -hematuria, -frequency  Musculoskeletal: -joint pain, -muscle pain  Skin: -rash, -lesion  Neurological: -headache, -dizziness, +numbness, -tingling  Psychiatric: -anxiety, -depression, -sleep difficulty, +increased stressors          Medical / Social / Family History     Past Medical History:   Diagnosis Date    Amyloidosis     Hypertension        History reviewed. No pertinent surgical history.    Medications and Allergies     Outpatient Medications Marked as Taking for the 5/13/25 encounter (Office Visit) with Holly Smith NP   Medication Sig Dispense Refill    azelastine (ASTELIN) 137 mcg (0.1 %) nasal spray 2 sprays by Nasal route 2 (two) times daily.      ferrous sulfate 324 mg (65 mg iron) TbEC Take 324 mg by mouth once daily.       fluticasone propionate (FLONASE ALLERGY RELIEF) 50 mcg/actuation nasal spray 1 spray by Each Nostril route as needed.      [DISCONTINUED] amLODIPine (NORVASC) 10 MG tablet 1 tablet Orally Once a day for 30 day(s)      [DISCONTINUED] ergocalciferol (ERGOCALCIFEROL) 50,000 unit Cap Take 1 capsule (50,000 Units total) by mouth every 7 days. 12 capsule 1    [DISCONTINUED] losartan (COZAAR) 100 MG tablet 1 tablet Orally Once a day for 90 days      [DISCONTINUED] triamterene-hydrochlorothiazide 37.5-25 mg (MAXZIDE-25) 37.5-25 mg per tablet Take 1 tablet by mouth once daily.         Review of patient's allergies indicates:  No Known Allergies    Physical Examination     Vitals:    05/13/25 0942   BP: 136/78   Pulse:    Resp:    Temp:      Physical Exam  Vitals and nursing note reviewed.   Constitutional:       Appearance: She is obese.   HENT:      Head: Normocephalic.      Right Ear: Tympanic membrane normal.      Left Ear: Tympanic membrane normal.      Nose: Nose normal.      Mouth/Throat:      Mouth: Mucous membranes are moist.      Pharynx: Oropharynx is clear. No posterior oropharyngeal erythema.   Eyes:      Conjunctiva/sclera: Conjunctivae normal.   Cardiovascular:      Rate and Rhythm: Normal rate and regular rhythm.      Pulses: Normal pulses.      Heart sounds: Normal heart sounds.   Pulmonary:      Effort: Pulmonary effort is normal.      Breath sounds: Normal breath sounds.   Abdominal:      General: Abdomen is flat. Bowel sounds are normal. There is no distension.      Palpations: Abdomen is soft.   Musculoskeletal:         General: No swelling or tenderness. Normal range of motion.      Cervical back: Normal range of motion.      Right lower leg: No edema.      Left lower leg: No edema.   Skin:     General: Skin is warm and dry.      Capillary Refill: Capillary refill takes less than 2 seconds.   Neurological:      Mental Status: She is alert. Mental status is at baseline.   Psychiatric:         Mood and  Affect: Mood normal.         Behavior: Behavior normal.                 Assessment and Plan      Problem List Items Addressed This Visit       Vitamin D deficiency    Relevant Medications    ergocalciferol (ERGOCALCIFEROL) 50,000 unit Cap    Other Relevant Orders    Vitamin D (Completed)    Hypertension - Primary    Relevant Medications    triamterene-hydrochlorothiazide 37.5-25 mg (MAXZIDE-25) 37.5-25 mg per tablet    losartan (COZAAR) 100 MG tablet    amLODIPine (NORVASC) 10 MG tablet    Other Relevant Orders    CBC Auto Differential (Completed)    Comprehensive Metabolic Panel (Completed)     Other Visit Diagnoses         Screening for lipid disorders        Relevant Orders    Lipid Panel (Completed)      Screening for malignant neoplasm of colon        Relevant Orders    Cologuard Screening (Multitarget Stool DNA)      Screening for malignant neoplasm of cervix        Relevant Orders    Ambulatory referral/consult to Gynecology      Screening for diabetes mellitus        Relevant Orders    Hemoglobin A1C (Completed)            Assessment & Plan    IMPRESSION:  Assessed stress levels related to caring for elderly family members and work-related issues.  Evaluated amyloidosis management, noting recent nerve testing results and borderline status not requiring medication.  Reviewed BP, initially elevated but improved during the visit, likely due to taking medication.  Considered fatigue in relation to iron supplementation.    HYPERTENSION:  - Blood pressure measured at 140/78 and later 136/78 during visit, elevated likely due to missed medication.  - Renewed prescription for blood pressure medication.  - Advised patient on proper positioning and stillness when using wrist BP monitors for accurate readings.  - Recommend checking BP at home or at a pharmacy, and suggested purchasing a home BP monitor.    FATIGUE:  - Patient reports fatigue is somewhat better since starting iron supplements but still feels the need to  take naps.  - Will continue iron supplementation therapy.      FOLLOW-UP AND ADDITIONAL TESTS:  - Ordered CBC, CMP, lipid panel, and vitamin D level.  - Continuing vitamin D supplementation.  - Ordered Cologuard screening kit to be sent to patient's home.          FOLLOW UP  Will call with lab results.   Follow up in 3 months or as needed.      Health Maintenance     Health Maintenance Topics with due status: Not Due       Topic Last Completion Date    Mammogram 09/09/2024    Hemoglobin A1c (Diabetic Prevention Screening) 05/13/2025    Lipid Panel 05/13/2025    Influenza Vaccine Not Due    RSV Vaccine (Age 60+ and Pregnant patients) Not Due       Health Maintenance Due   Topic Date Due    TETANUS VACCINE  Never done    Colorectal Cancer Screening  Never done    COVID-19 Vaccine (1 - 2024-25 season) Never done    Cervical Cancer Screening  06/19/2025          Signature:  Holly Smith NP  [address] [place]  [address]    Date of encounter: 5/13/25    This note was generated with the assistance of ambient listening technology. Verbal consent was obtained by the patient and accompanying visitor(s) for the recording of patient appointment to facilitate this note. I attest to having reviewed and edited the generated note for accuracy, though some syntax or spelling errors may persist. Please contact the author of this note for any clarification.